# Patient Record
Sex: FEMALE | Race: WHITE | NOT HISPANIC OR LATINO | Employment: FULL TIME | URBAN - METROPOLITAN AREA
[De-identification: names, ages, dates, MRNs, and addresses within clinical notes are randomized per-mention and may not be internally consistent; named-entity substitution may affect disease eponyms.]

---

## 2021-09-02 ENCOUNTER — ANESTHESIA EVENT (OUTPATIENT)
Dept: GASTROENTEROLOGY | Facility: HOSPITAL | Age: 26
End: 2021-09-02

## 2021-09-02 ENCOUNTER — HOSPITAL ENCOUNTER (OUTPATIENT)
Facility: HOSPITAL | Age: 26
Discharge: HOME | End: 2021-09-02
Attending: INTERNAL MEDICINE | Admitting: INTERNAL MEDICINE

## 2021-09-02 ENCOUNTER — ANESTHESIA (OUTPATIENT)
Dept: GASTROENTEROLOGY | Facility: HOSPITAL | Age: 26
End: 2021-09-02

## 2021-09-02 ENCOUNTER — APPOINTMENT (OUTPATIENT)
Dept: GENERAL RADIOLOGY | Facility: HOSPITAL | Age: 26
End: 2021-09-02

## 2021-09-02 VITALS
OXYGEN SATURATION: 96 % | HEART RATE: 81 BPM | WEIGHT: 249.6 LBS | SYSTOLIC BLOOD PRESSURE: 140 MMHG | RESPIRATION RATE: 29 BRPM | DIASTOLIC BLOOD PRESSURE: 62 MMHG | BODY MASS INDEX: 44.23 KG/M2 | HEIGHT: 63 IN | TEMPERATURE: 101 F

## 2021-09-02 DIAGNOSIS — K80.50 COMMON BILE DUCT CALCULI: Primary | ICD-10-CM

## 2021-09-02 DIAGNOSIS — R74.8 ELEVATED LIVER ENZYMES: ICD-10-CM

## 2021-09-02 PROBLEM — E66.9 OBESITY (BMI 30-39.9): Chronic | Status: ACTIVE | Noted: 2021-09-02

## 2021-09-02 PROBLEM — Z72.0 TOBACCO ABUSE: Chronic | Status: ACTIVE | Noted: 2021-09-02

## 2021-09-02 LAB
ALBUMIN SERPL-MCNC: 4.5 G/DL (ref 3.5–5.2)
ALBUMIN/GLOB SERPL: 1.2 G/DL
ALP SERPL-CCNC: 182 U/L (ref 39–117)
ALT SERPL W P-5'-P-CCNC: 539 U/L (ref 1–33)
ANION GAP SERPL CALCULATED.3IONS-SCNC: 11 MMOL/L (ref 5–15)
AST SERPL-CCNC: 335 U/L (ref 1–32)
BILIRUB SERPL-MCNC: 2 MG/DL (ref 0–1.2)
BUN SERPL-MCNC: 6 MG/DL (ref 6–20)
BUN/CREAT SERPL: 6.8 (ref 7–25)
CALCIUM SPEC-SCNC: 9.3 MG/DL (ref 8.6–10.5)
CHLORIDE SERPL-SCNC: 100 MMOL/L (ref 98–107)
CO2 SERPL-SCNC: 27 MMOL/L (ref 22–29)
CREAT SERPL-MCNC: 0.88 MG/DL (ref 0.57–1)
D-LACTATE SERPL-SCNC: 1.6 MMOL/L (ref 0.5–2)
DEPRECATED RDW RBC AUTO: 42.4 FL (ref 37–54)
ERYTHROCYTE [DISTWIDTH] IN BLOOD BY AUTOMATED COUNT: 14.5 % (ref 12.3–15.4)
GFR SERPL CREATININE-BSD FRML MDRD: 77 ML/MIN/1.73
GLOBULIN UR ELPH-MCNC: 3.7 GM/DL
GLUCOSE SERPL-MCNC: 91 MG/DL (ref 65–99)
HCG INTACT+B SERPL-ACNC: <0.1 MIU/ML
HCT VFR BLD AUTO: 43.1 % (ref 34–46.6)
HGB BLD-MCNC: 14.3 G/DL (ref 12–15.9)
MCH RBC QN AUTO: 27.5 PG (ref 26.6–33)
MCHC RBC AUTO-ENTMCNC: 33.2 G/DL (ref 31.5–35.7)
MCV RBC AUTO: 82.7 FL (ref 79–97)
PLATELET # BLD AUTO: 263 10*3/MM3 (ref 140–450)
PMV BLD AUTO: 7.2 FL (ref 6–12)
POTASSIUM SERPL-SCNC: 4.3 MMOL/L (ref 3.5–5.2)
PROT SERPL-MCNC: 8.2 G/DL (ref 6–8.5)
RBC # BLD AUTO: 5.21 10*6/MM3 (ref 3.77–5.28)
SARS-COV-2 RNA PNL SPEC NAA+PROBE: NOT DETECTED
SODIUM SERPL-SCNC: 138 MMOL/L (ref 136–145)
WBC # BLD AUTO: 11.8 10*3/MM3 (ref 3.4–10.8)

## 2021-09-02 PROCEDURE — 25010000002 PROPOFOL 10 MG/ML EMULSION: Performed by: ANESTHESIOLOGY

## 2021-09-02 PROCEDURE — 99203 OFFICE O/P NEW LOW 30 MIN: CPT | Performed by: SURGERY

## 2021-09-02 PROCEDURE — 25010000002 ONDANSETRON PER 1 MG: Performed by: ANESTHESIOLOGY

## 2021-09-02 PROCEDURE — C1769 GUIDE WIRE: HCPCS | Performed by: INTERNAL MEDICINE

## 2021-09-02 PROCEDURE — 25010000002 KETOROLAC TROMETHAMINE PER 15 MG: Performed by: ANESTHESIOLOGY

## 2021-09-02 PROCEDURE — G0378 HOSPITAL OBSERVATION PER HR: HCPCS

## 2021-09-02 PROCEDURE — G0379 DIRECT REFER HOSPITAL OBSERV: HCPCS

## 2021-09-02 PROCEDURE — 83605 ASSAY OF LACTIC ACID: CPT | Performed by: NURSE PRACTITIONER

## 2021-09-02 PROCEDURE — C9803 HOPD COVID-19 SPEC COLLECT: HCPCS

## 2021-09-02 PROCEDURE — 87635 SARS-COV-2 COVID-19 AMP PRB: CPT | Performed by: INTERNAL MEDICINE

## 2021-09-02 PROCEDURE — 25010000002 MORPHINE PER 10 MG: Performed by: NURSE PRACTITIONER

## 2021-09-02 PROCEDURE — 84702 CHORIONIC GONADOTROPIN TEST: CPT | Performed by: NURSE PRACTITIONER

## 2021-09-02 PROCEDURE — 25010000002 IOPAMIDOL 61 % SOLUTION 30 ML VIAL: Performed by: INTERNAL MEDICINE

## 2021-09-02 PROCEDURE — 25010000002 FENTANYL CITRATE (PF) 100 MCG/2ML SOLUTION: Performed by: ANESTHESIOLOGY

## 2021-09-02 PROCEDURE — 80053 COMPREHEN METABOLIC PANEL: CPT | Performed by: NURSE PRACTITIONER

## 2021-09-02 PROCEDURE — 25010000002 NEOSTIGMINE 5 MG/5ML SOLUTION: Performed by: ANESTHESIOLOGY

## 2021-09-02 PROCEDURE — 99236 HOSP IP/OBS SAME DATE HI 85: CPT | Performed by: INTERNAL MEDICINE

## 2021-09-02 PROCEDURE — 74328 X-RAY BILE DUCT ENDOSCOPY: CPT

## 2021-09-02 PROCEDURE — 25010000002 DEXAMETHASONE PER 1 MG: Performed by: ANESTHESIOLOGY

## 2021-09-02 PROCEDURE — 96374 THER/PROPH/DIAG INJ IV PUSH: CPT

## 2021-09-02 PROCEDURE — 25010000002 MIDAZOLAM PER 1 MG: Performed by: ANESTHESIOLOGY

## 2021-09-02 PROCEDURE — 25010000002 PIPERACILLIN SOD-TAZOBACTAM PER 1 G: Performed by: NURSE PRACTITIONER

## 2021-09-02 PROCEDURE — 85027 COMPLETE CBC AUTOMATED: CPT | Performed by: NURSE PRACTITIONER

## 2021-09-02 RX ORDER — ALBUTEROL SULFATE 2.5 MG/3ML
2.5 SOLUTION RESPIRATORY (INHALATION) ONCE AS NEEDED
Status: DISCONTINUED | OUTPATIENT
Start: 2021-09-02 | End: 2021-09-02

## 2021-09-02 RX ORDER — GLYCOPYRROLATE 1 MG/5 ML
SYRINGE (ML) INTRAVENOUS AS NEEDED
Status: DISCONTINUED | OUTPATIENT
Start: 2021-09-02 | End: 2021-09-02 | Stop reason: SURG

## 2021-09-02 RX ORDER — PROPOFOL 10 MG/ML
VIAL (ML) INTRAVENOUS AS NEEDED
Status: DISCONTINUED | OUTPATIENT
Start: 2021-09-02 | End: 2021-09-02 | Stop reason: SURG

## 2021-09-02 RX ORDER — MIDAZOLAM HYDROCHLORIDE 1 MG/ML
1 INJECTION INTRAMUSCULAR; INTRAVENOUS
Status: DISCONTINUED | OUTPATIENT
Start: 2021-09-02 | End: 2021-09-02

## 2021-09-02 RX ORDER — HYDROMORPHONE HCL 110MG/55ML
0.5 PATIENT CONTROLLED ANALGESIA SYRINGE INTRAVENOUS
Status: DISCONTINUED | OUTPATIENT
Start: 2021-09-02 | End: 2021-09-02

## 2021-09-02 RX ORDER — ACETAMINOPHEN 650 MG/1
650 SUPPOSITORY RECTAL EVERY 4 HOURS PRN
Status: CANCELLED | OUTPATIENT
Start: 2021-09-02

## 2021-09-02 RX ORDER — ALBUTEROL SULFATE 2.5 MG/3ML
2.5 SOLUTION RESPIRATORY (INHALATION) ONCE AS NEEDED
Status: DISCONTINUED | OUTPATIENT
Start: 2021-09-02 | End: 2021-09-02 | Stop reason: HOSPADM

## 2021-09-02 RX ORDER — FLUMAZENIL 0.1 MG/ML
0.2 INJECTION INTRAVENOUS AS NEEDED
Status: DISCONTINUED | OUTPATIENT
Start: 2021-09-02 | End: 2021-09-02

## 2021-09-02 RX ORDER — ONDANSETRON 4 MG/1
4 TABLET, FILM COATED ORAL EVERY 8 HOURS PRN
COMMUNITY

## 2021-09-02 RX ORDER — NALOXONE HCL 0.4 MG/ML
0.4 VIAL (ML) INJECTION AS NEEDED
Status: DISCONTINUED | OUTPATIENT
Start: 2021-09-02 | End: 2021-09-02 | Stop reason: HOSPADM

## 2021-09-02 RX ORDER — DIPHENHYDRAMINE HYDROCHLORIDE 50 MG/ML
12.5 INJECTION INTRAMUSCULAR; INTRAVENOUS
Status: DISCONTINUED | OUTPATIENT
Start: 2021-09-02 | End: 2021-09-02

## 2021-09-02 RX ORDER — ACETAMINOPHEN 325 MG/1
650 TABLET ORAL EVERY 4 HOURS PRN
Status: DISCONTINUED | OUTPATIENT
Start: 2021-09-02 | End: 2021-09-02 | Stop reason: HOSPADM

## 2021-09-02 RX ORDER — SODIUM CHLORIDE 9 MG/ML
INJECTION INTRAVENOUS
Status: DISCONTINUED
Start: 2021-09-02 | End: 2021-09-02 | Stop reason: HOSPADM

## 2021-09-02 RX ORDER — NALBUPHINE HCL 10 MG/ML
2 AMPUL (ML) INJECTION EVERY 4 HOURS PRN
Status: DISCONTINUED | OUTPATIENT
Start: 2021-09-02 | End: 2021-09-02 | Stop reason: HOSPADM

## 2021-09-02 RX ORDER — ACETAMINOPHEN 650 MG/1
650 SUPPOSITORY RECTAL EVERY 4 HOURS PRN
Status: DISCONTINUED | OUTPATIENT
Start: 2021-09-02 | End: 2021-09-02 | Stop reason: HOSPADM

## 2021-09-02 RX ORDER — FLUMAZENIL 0.1 MG/ML
0.2 INJECTION INTRAVENOUS AS NEEDED
Status: DISCONTINUED | OUTPATIENT
Start: 2021-09-02 | End: 2021-09-02 | Stop reason: HOSPADM

## 2021-09-02 RX ORDER — HYDROCODONE BITARTRATE AND ACETAMINOPHEN 5; 325 MG/1; MG/1
1 TABLET ORAL EVERY 6 HOURS PRN
COMMUNITY

## 2021-09-02 RX ORDER — ROCURONIUM BROMIDE 10 MG/ML
INJECTION, SOLUTION INTRAVENOUS AS NEEDED
Status: DISCONTINUED | OUTPATIENT
Start: 2021-09-02 | End: 2021-09-02 | Stop reason: SURG

## 2021-09-02 RX ORDER — NALBUPHINE HCL 10 MG/ML
2 AMPUL (ML) INJECTION EVERY 4 HOURS PRN
Status: DISCONTINUED | OUTPATIENT
Start: 2021-09-02 | End: 2021-09-02

## 2021-09-02 RX ORDER — NALBUPHINE HCL 10 MG/ML
10 AMPUL (ML) INJECTION EVERY 4 HOURS PRN
Status: DISCONTINUED | OUTPATIENT
Start: 2021-09-02 | End: 2021-09-02

## 2021-09-02 RX ORDER — ACETAMINOPHEN 160 MG/5ML
650 SOLUTION ORAL EVERY 4 HOURS PRN
Status: DISCONTINUED | OUTPATIENT
Start: 2021-09-02 | End: 2021-09-02 | Stop reason: HOSPADM

## 2021-09-02 RX ORDER — SODIUM CHLORIDE 9 MG/ML
100 INJECTION, SOLUTION INTRAVENOUS CONTINUOUS
Status: CANCELLED | OUTPATIENT
Start: 2021-09-02

## 2021-09-02 RX ORDER — ONDANSETRON 2 MG/ML
INJECTION INTRAMUSCULAR; INTRAVENOUS AS NEEDED
Status: DISCONTINUED | OUTPATIENT
Start: 2021-09-02 | End: 2021-09-02 | Stop reason: SURG

## 2021-09-02 RX ORDER — MAGNESIUM SULFATE 1 G/100ML
1 INJECTION INTRAVENOUS AS NEEDED
Status: DISCONTINUED | OUTPATIENT
Start: 2021-09-02 | End: 2021-09-02 | Stop reason: HOSPADM

## 2021-09-02 RX ORDER — ONDANSETRON 2 MG/ML
4 INJECTION INTRAMUSCULAR; INTRAVENOUS EVERY 6 HOURS PRN
Status: DISCONTINUED | OUTPATIENT
Start: 2021-09-02 | End: 2021-09-02 | Stop reason: HOSPADM

## 2021-09-02 RX ORDER — SODIUM CHLORIDE 9 MG/ML
100 INJECTION, SOLUTION INTRAVENOUS CONTINUOUS
Status: DISCONTINUED | OUTPATIENT
Start: 2021-09-02 | End: 2021-09-02 | Stop reason: HOSPADM

## 2021-09-02 RX ORDER — FENTANYL CITRATE 50 UG/ML
INJECTION, SOLUTION INTRAMUSCULAR; INTRAVENOUS AS NEEDED
Status: DISCONTINUED | OUTPATIENT
Start: 2021-09-02 | End: 2021-09-02 | Stop reason: SURG

## 2021-09-02 RX ORDER — LIDOCAINE HYDROCHLORIDE 20 MG/ML
INJECTION, SOLUTION EPIDURAL; INFILTRATION; INTRACAUDAL; PERINEURAL AS NEEDED
Status: DISCONTINUED | OUTPATIENT
Start: 2021-09-02 | End: 2021-09-02 | Stop reason: SURG

## 2021-09-02 RX ORDER — ONDANSETRON 4 MG/1
4 TABLET, FILM COATED ORAL EVERY 6 HOURS PRN
Status: DISCONTINUED | OUTPATIENT
Start: 2021-09-02 | End: 2021-09-02 | Stop reason: HOSPADM

## 2021-09-02 RX ORDER — SODIUM CHLORIDE 0.9 % (FLUSH) 0.9 %
10 SYRINGE (ML) INJECTION AS NEEDED
Status: CANCELLED | OUTPATIENT
Start: 2021-09-02

## 2021-09-02 RX ORDER — NALBUPHINE HCL 10 MG/ML
10 AMPUL (ML) INJECTION EVERY 4 HOURS PRN
Status: DISCONTINUED | OUTPATIENT
Start: 2021-09-02 | End: 2021-09-02 | Stop reason: HOSPADM

## 2021-09-02 RX ORDER — ACETAMINOPHEN 325 MG/1
650 TABLET ORAL EVERY 4 HOURS PRN
Status: CANCELLED | OUTPATIENT
Start: 2021-09-02

## 2021-09-02 RX ORDER — ONDANSETRON 2 MG/ML
4 INJECTION INTRAMUSCULAR; INTRAVENOUS ONCE AS NEEDED
Status: DISCONTINUED | OUTPATIENT
Start: 2021-09-02 | End: 2021-09-02

## 2021-09-02 RX ORDER — MIDAZOLAM HYDROCHLORIDE 1 MG/ML
1 INJECTION INTRAMUSCULAR; INTRAVENOUS
Status: DISCONTINUED | OUTPATIENT
Start: 2021-09-02 | End: 2021-09-02 | Stop reason: HOSPADM

## 2021-09-02 RX ORDER — MORPHINE SULFATE 4 MG/ML
2 INJECTION, SOLUTION INTRAMUSCULAR; INTRAVENOUS EVERY 4 HOURS PRN
Status: DISCONTINUED | OUTPATIENT
Start: 2021-09-02 | End: 2021-09-02 | Stop reason: HOSPADM

## 2021-09-02 RX ORDER — LORAZEPAM 2 MG/ML
1 INJECTION INTRAMUSCULAR
Status: DISCONTINUED | OUTPATIENT
Start: 2021-09-02 | End: 2021-09-02 | Stop reason: HOSPADM

## 2021-09-02 RX ORDER — KETOROLAC TROMETHAMINE 30 MG/ML
INJECTION, SOLUTION INTRAMUSCULAR; INTRAVENOUS AS NEEDED
Status: DISCONTINUED | OUTPATIENT
Start: 2021-09-02 | End: 2021-09-02 | Stop reason: SURG

## 2021-09-02 RX ORDER — ACETAMINOPHEN 160 MG/5ML
650 SOLUTION ORAL EVERY 4 HOURS PRN
Status: CANCELLED | OUTPATIENT
Start: 2021-09-02

## 2021-09-02 RX ORDER — HYDROMORPHONE HCL 110MG/55ML
0.5 PATIENT CONTROLLED ANALGESIA SYRINGE INTRAVENOUS
Status: DISCONTINUED | OUTPATIENT
Start: 2021-09-02 | End: 2021-09-02 | Stop reason: HOSPADM

## 2021-09-02 RX ORDER — SODIUM CHLORIDE 0.9 % (FLUSH) 0.9 %
10 SYRINGE (ML) INJECTION EVERY 12 HOURS SCHEDULED
Status: DISCONTINUED | OUTPATIENT
Start: 2021-09-02 | End: 2021-09-02 | Stop reason: HOSPADM

## 2021-09-02 RX ORDER — NALOXONE HCL 0.4 MG/ML
0.4 VIAL (ML) INJECTION AS NEEDED
Status: DISCONTINUED | OUTPATIENT
Start: 2021-09-02 | End: 2021-09-02

## 2021-09-02 RX ORDER — MEPERIDINE HYDROCHLORIDE 25 MG/ML
12.5 INJECTION INTRAMUSCULAR; INTRAVENOUS; SUBCUTANEOUS
Status: DISCONTINUED | OUTPATIENT
Start: 2021-09-02 | End: 2021-09-02

## 2021-09-02 RX ORDER — DIPHENHYDRAMINE HYDROCHLORIDE 50 MG/ML
12.5 INJECTION INTRAMUSCULAR; INTRAVENOUS
Status: DISCONTINUED | OUTPATIENT
Start: 2021-09-02 | End: 2021-09-02 | Stop reason: HOSPADM

## 2021-09-02 RX ORDER — ONDANSETRON 4 MG/1
4 TABLET, FILM COATED ORAL EVERY 6 HOURS PRN
Status: CANCELLED | OUTPATIENT
Start: 2021-09-02

## 2021-09-02 RX ORDER — ONDANSETRON 2 MG/ML
4 INJECTION INTRAMUSCULAR; INTRAVENOUS ONCE AS NEEDED
Status: DISCONTINUED | OUTPATIENT
Start: 2021-09-02 | End: 2021-09-02 | Stop reason: HOSPADM

## 2021-09-02 RX ORDER — DEXAMETHASONE SODIUM PHOSPHATE 4 MG/ML
INJECTION, SOLUTION INTRA-ARTICULAR; INTRALESIONAL; INTRAMUSCULAR; INTRAVENOUS; SOFT TISSUE AS NEEDED
Status: DISCONTINUED | OUTPATIENT
Start: 2021-09-02 | End: 2021-09-02 | Stop reason: SURG

## 2021-09-02 RX ORDER — POTASSIUM CHLORIDE 20 MEQ/1
40 TABLET, EXTENDED RELEASE ORAL AS NEEDED
Status: DISCONTINUED | OUTPATIENT
Start: 2021-09-02 | End: 2021-09-02 | Stop reason: HOSPADM

## 2021-09-02 RX ORDER — LORAZEPAM 2 MG/ML
1 INJECTION INTRAMUSCULAR
Status: DISCONTINUED | OUTPATIENT
Start: 2021-09-02 | End: 2021-09-02

## 2021-09-02 RX ORDER — SODIUM CHLORIDE 0.9 % (FLUSH) 0.9 %
10 SYRINGE (ML) INJECTION EVERY 12 HOURS SCHEDULED
Status: CANCELLED | OUTPATIENT
Start: 2021-09-02

## 2021-09-02 RX ORDER — MIDAZOLAM HYDROCHLORIDE 1 MG/ML
INJECTION INTRAMUSCULAR; INTRAVENOUS AS NEEDED
Status: DISCONTINUED | OUTPATIENT
Start: 2021-09-02 | End: 2021-09-02 | Stop reason: SURG

## 2021-09-02 RX ORDER — PANTOPRAZOLE SODIUM 40 MG/10ML
40 INJECTION, POWDER, LYOPHILIZED, FOR SOLUTION INTRAVENOUS
Status: DISCONTINUED | OUTPATIENT
Start: 2021-09-03 | End: 2021-09-02 | Stop reason: HOSPADM

## 2021-09-02 RX ORDER — SODIUM CHLORIDE 0.9 % (FLUSH) 0.9 %
10 SYRINGE (ML) INJECTION AS NEEDED
Status: DISCONTINUED | OUTPATIENT
Start: 2021-09-02 | End: 2021-09-02 | Stop reason: HOSPADM

## 2021-09-02 RX ORDER — NEOSTIGMINE METHYLSULFATE 5 MG/5 ML
SYRINGE (ML) INTRAVENOUS AS NEEDED
Status: DISCONTINUED | OUTPATIENT
Start: 2021-09-02 | End: 2021-09-02 | Stop reason: SURG

## 2021-09-02 RX ORDER — SODIUM CHLORIDE, SODIUM LACTATE, POTASSIUM CHLORIDE, CALCIUM CHLORIDE 600; 310; 30; 20 MG/100ML; MG/100ML; MG/100ML; MG/100ML
INJECTION, SOLUTION INTRAVENOUS CONTINUOUS PRN
Status: DISCONTINUED | OUTPATIENT
Start: 2021-09-02 | End: 2021-09-02 | Stop reason: SURG

## 2021-09-02 RX ORDER — MEPERIDINE HYDROCHLORIDE 25 MG/ML
12.5 INJECTION INTRAMUSCULAR; INTRAVENOUS; SUBCUTANEOUS
Status: DISCONTINUED | OUTPATIENT
Start: 2021-09-02 | End: 2021-09-02 | Stop reason: HOSPADM

## 2021-09-02 RX ORDER — ONDANSETRON 2 MG/ML
4 INJECTION INTRAMUSCULAR; INTRAVENOUS EVERY 6 HOURS PRN
Status: CANCELLED | OUTPATIENT
Start: 2021-09-02

## 2021-09-02 RX ORDER — PANTOPRAZOLE SODIUM 40 MG/10ML
40 INJECTION, POWDER, LYOPHILIZED, FOR SOLUTION INTRAVENOUS
Status: CANCELLED | OUTPATIENT
Start: 2021-09-02

## 2021-09-02 RX ORDER — MAGNESIUM SULFATE HEPTAHYDRATE 40 MG/ML
2 INJECTION, SOLUTION INTRAVENOUS AS NEEDED
Status: DISCONTINUED | OUTPATIENT
Start: 2021-09-02 | End: 2021-09-02 | Stop reason: HOSPADM

## 2021-09-02 RX ADMIN — DEXAMETHASONE SODIUM PHOSPHATE 4 MG: 4 INJECTION, SOLUTION INTRAMUSCULAR; INTRAVENOUS at 16:18

## 2021-09-02 RX ADMIN — ONDANSETRON 4 MG: 2 INJECTION INTRAMUSCULAR; INTRAVENOUS at 16:18

## 2021-09-02 RX ADMIN — Medication 4 MG: at 15:52

## 2021-09-02 RX ADMIN — MIDAZOLAM 2 MG: 1 INJECTION INTRAMUSCULAR; INTRAVENOUS at 16:18

## 2021-09-02 RX ADMIN — SODIUM CHLORIDE, SODIUM LACTATE, POTASSIUM CHLORIDE, AND CALCIUM CHLORIDE: .6; .31; .03; .02 INJECTION, SOLUTION INTRAVENOUS at 16:18

## 2021-09-02 RX ADMIN — INDOMETHACIN: 50 SUPPOSITORY RECTAL at 17:45

## 2021-09-02 RX ADMIN — PIPERACILLIN SODIUM AND TAZOBACTAM SODIUM 4.5 G: 4; .5 INJECTION, POWDER, LYOPHILIZED, FOR SOLUTION INTRAVENOUS at 13:58

## 2021-09-02 RX ADMIN — ROCURONIUM BROMIDE 50 MG: 10 INJECTION INTRAVENOUS at 16:18

## 2021-09-02 RX ADMIN — Medication 0.8 MG: at 15:52

## 2021-09-02 RX ADMIN — FENTANYL CITRATE 100 MCG: 50 INJECTION, SOLUTION INTRAMUSCULAR; INTRAVENOUS at 16:18

## 2021-09-02 RX ADMIN — SODIUM CHLORIDE, PRESERVATIVE FREE 10 ML: 5 INJECTION INTRAVENOUS at 13:28

## 2021-09-02 RX ADMIN — KETOROLAC TROMETHAMINE 30 MG: 30 INJECTION, SOLUTION INTRAMUSCULAR at 16:18

## 2021-09-02 RX ADMIN — PROPOFOL 200 MG: 10 INJECTION, EMULSION INTRAVENOUS at 16:18

## 2021-09-02 RX ADMIN — LIDOCAINE HYDROCHLORIDE 50 MG: 20 INJECTION, SOLUTION EPIDURAL; INFILTRATION; INTRACAUDAL; PERINEURAL at 16:18

## 2021-09-02 RX ADMIN — MORPHINE SULFATE 2 MG: 4 INJECTION INTRAVENOUS at 13:26

## 2021-09-02 RX ADMIN — ACETAMINOPHEN 650 MG: 325 TABLET, FILM COATED ORAL at 13:26

## 2021-09-02 RX ADMIN — SUGAMMADEX 200 MG: 100 INJECTION, SOLUTION INTRAVENOUS at 16:50

## 2021-09-02 RX ADMIN — SODIUM CHLORIDE 100 ML/HR: 9 INJECTION, SOLUTION INTRAVENOUS at 13:27

## 2021-09-02 NOTE — DISCHARGE SUMMARY
[facility] Medicine Services  DISCHARGE SUMMARY    Patient Name: Kimberly Penaloza  : 1995  MRN: [id number]    Date of Admission: 2021  Date of Discharge: 9/3/2021  Primary Care Physician: Eber Angel MD      Presenting Problem:   Common bile duct calculi [K80.50]    Active and Resolved Hospital Problems:  Active Hospital Problems    Diagnosis POA   • **Choledocholithiasis [K80.50] Yes   • Obesity (BMI 30-39.9) [E66.9] Yes   • Tobacco abuse [Z72.0] Yes   • Elevated liver enzymes [R74.8] Yes      Resolved Hospital Problems   No resolved problems to display.         Hospital Course     Hospital Course:  Kimberly Penaloza is a 26 y.o. female with no known past medical history who presented to [facility] on 2021 with complaints of abdominal pain.  Patient reports she had abdominal pain that started on Saturday.  [facility] gave her pain medication and nausea medication and referred her to follow-up with a general surgeon outpatient.  On Monday she went to follow-up with general surgeon and he ordered some outpatient labs.  Patient reports she was supposed to get the blood work done today but she started to vomit and her pain started to get worse. The following records were obtained from [facility]:  All vital signs were stable.  All labs were unremarkable except , , total bilirubin 2.0.  CT abdomen pelvis showed cholelithiasis and choledocholithiasis with dilated common bile duct measuring up to 12 mm.  Recommend surgical and/or GI consultation for likely ERCP.  Hepatic steatosis.  Patient was transferred to [facility] and GI and general surgery consulted.  Patient was started on IV Zosyn initially, GI evaluated the patient and decided for ERCP on the same day.  Patient underwent ERCP with results as noted below.  She has done well postop, she was also evaluated for surgery.  After further discussion  with patient she wanted to leave after the procedure and follow-up outpatient for cholecystectomy with general surgery.  Due to urgent need to leave the hospital due to issues with childcare at home GI and general surgery okay with discharge with outpatient follow-up.  SHANELL already sent oral Cipro and Flagyl to her pharmacy for pickup.  Patient is clinically stable to discharge home today with follow-up with PCP, GI, and general surgery as an outpatient.      DISCHARGE Follow Up Recommendations for labs and diagnostics: Follow-up with PCP, GI, and general surgery.      Reasons For Change In Medications and Indications for New Medications:  Cipro and Flagyl added by SHANELL, directly sent to her pharmacy.    Day of Discharge     Vital Signs:  Temp:  [101 °F (38.3 °C)] 101 °F (38.3 °C)  Heart Rate:  [] 81  Resp:  [16-33] 29  BP: (112-173)/(52-78) 140/62  Flow (L/min):  [4] 4    Physical Exam:  General: Awake, alert, NAD  Eyes: PERRL, EOMI, conjunctive are clear  Cardiovascular: Regular rate and rhythm, no murmurs  Respiratory: Clear to auscultation bilaterally, no wheezing or rales, unlabored breathing  Abdomen: Soft, right upper quadrant tenderness noted, positive bowel sounds, no guarding  Neurologic: A&O, CN grossly intact, moves all extremities spontaneously  Musculoskeletal: Normal range of motion, no deformities  Skin: Warm, dry, intact        Pertinent  and/or Most Recent Results     LAB RESULTS:      Lab 09/02/21  1302   WBC 11.80*   HEMOGLOBIN 14.3   HEMATOCRIT 43.1   PLATELETS 263   MCV 82.7   LACTATE 1.6         Lab 09/02/21  1301   SODIUM 138   POTASSIUM 4.3   CHLORIDE 100   CO2 27.0   ANION GAP 11.0   BUN 6   CREATININE 0.88   GLUCOSE 91   CALCIUM 9.3         Lab 09/02/21  1301   TOTAL PROTEIN 8.2   ALBUMIN 4.50   GLOBULIN 3.7   ALT (SGPT) 539*   AST (SGOT) 335*   BILIRUBIN 2.0*   ALK PHOS 182*                     Brief Urine Lab Results     None        Microbiology Results (last 10 days)     Procedure  Component Value - Date/Time    COVID PRE-OP / PRE-PROCEDURE SCREENING ORDER (NO ISOLATION) - Swab, Nasopharynx [[id number]]  (Normal) Collected: 09/02/21 1314    Lab Status: Final result Specimen: Swab from Nasopharynx Updated: 09/02/21 1409    Narrative:      The following orders were created for panel order COVID PRE-OP / PRE-PROCEDURE SCREENING ORDER (NO ISOLATION) - Swab, Nasopharynx.  Procedure                               Abnormality         Status                     ---------                               -----------         ------                     COVID-19,CEPHEID/TIFFANIE/BD...[[id number]]  Normal              Final result                 Please view results for these tests on the individual orders.    COVID-19,CEPHEID/TIFFANIE/BDMAX,COR/FELIBERTO/PAD/JORDANA IN-HOUSE(OR EMERGENT/ADD-ON),NP SWAB IN TRANSPORT MEDIA 3-4 HR TAT, RT-PCR - Swab, Nasopharynx [[id number]]  (Normal) Collected: 09/02/21 1314    Lab Status: Final result Specimen: Swab from Nasopharynx Updated: 09/02/21 1409     COVID19 Not Detected    Narrative:      Fact sheet for providers: https://www.fda.gov/media/831818/download     Fact sheet for patients: https://www.fda.gov/media/058635/download  Fact sheet for providers: https://www.fda.gov/media/704325/download    Fact sheet for patients: https://www.fda.gov/media/891857/download    Test performed by PCR.          FL ercp biliary duct only    Result Date: 9/2/2021  Impression: 1. ERCP images as reported above.  Electronically Signed By-Adrianna Taylor MD On:9/2/2021 5:24 PM This report was finalized on [id number] by  Adrianna Taylor MD.                  Labs Pending at Discharge:      Procedures Performed  Procedure(s):  ENDOSCOPIC RETROGRADE CHOLANGIOPANCREATOGRAPHY, SPHINCTEROTOMY, CLEARANCE OF BILE DUCT WITH BALLOON ,(12MM-15MM, UP TO 15 MM), EXTRACTION OF BILIARY STONES WITH BALLOON       Impression:  Choledocholithiasis status post ERCP with biliary sphincterotomy and successful extraction of CBD  stones  Cholelithiasis     Recommendations:  No aspirin or NSAIDs for 7 to 10 days  Cholecystectomy, timing per patient surgeon  If patient tolerates clear liquids upon return to her room, okay to discharge home due to need for urgent childcare.  However she cannot drive today due to anesthesia.  Though no purulence was noted in the common bile duct, I would recommend patient complete a course of antibiotics for possible early cholangitis.  Antibiotics have been sent into the patient's pharmacy electronically.     Consults:   Consults     Date and Time Order Name Status Description    9/2/2021 12:34 PM Inpatient General Surgery Consult Completed     9/2/2021 12:25 PM Inpatient Gastroenterology Consult              Discharge Details        Discharge Medications      Continue These Medications      Instructions Start Date   HYDROcodone-acetaminophen 5-325 MG per tablet  Commonly known as: NORCO   1 tablet, Oral, Every 6 Hours PRN      ondansetron 4 MG tablet  Commonly known as: ZOFRAN   4 mg, Oral, Every 8 Hours PRN, Pt states she takes this medication whenever she feel like it          Cipro and Flagyl added per GI, directly sent to her pharmacy.    No Known Allergies      Discharge Disposition:  Home or Self Care    Diet:  Hospital:  Diet Order   Procedures   • NPO Diet         Discharge Activity:         CODE STATUS:  Code Status and Medical Interventions:   Ordered at: 09/02/21 1225     Level Of Support Discussed With:    Patient     Code Status:    CPR     Medical Interventions (Level of Support Prior to Arrest):    Full         No future appointments.        Time spent on the H&P discharge including face to face service:  55 minutes    Signature:    Electronically signed by Vineet Olvera DO, 09/02/21, 5:36 PM EDT.

## 2021-09-02 NOTE — CONSULTS
GENERAL SURGERY CONSULT      Patient Care Team:  Eber Angel MD as PCP - General (Family Medicine)    Chief complaint cholelithiasis and choledocholithiasis  Subjective     Is a 26-year-old young lady who presented to [facility] with abdominal pain.  She had been known to have gallstones.  CT scan at that facility demonstrated cholelithiasis and choledocholithiasis with common bile duct of 12 mm and hepatic steatosis.  This is also shown on right upper quadrant ultrasound.  She also had elevation of her LFTs and alkaline phosphatase as well as a bilirubin of 2.0.  Lipase was normal.  She has been seen by gastroenterology.  Plan is for ERCP today.        Review of Systems   All systems were reviewed and negative except for:  Gastrointestinal: positive for  nausea, pain and See HPI    History  Past Medical History:   Diagnosis Date   • Tobacco abuse 2021     Past Surgical History:   Procedure Laterality Date   •  SECTION     • ESSURE TUBAL LIGATION       Family History   Problem Relation Age of Onset   • Chromosomal disorder Brother    • Cancer Brother      Social History     Tobacco Use   • Smoking status: Current Every Day Smoker     Packs/day: 0.50     Years: 5.00     Pack years: 2.50     Types: Cigarettes   • Smokeless tobacco: Never Used   Vaping Use   • Vaping Use: Never used   Substance Use Topics   • Alcohol use: Not Currently   • Drug use: Not Currently     Medications Prior to Admission   Medication Sig Dispense Refill Last Dose   • HYDROcodone-acetaminophen (NORCO) 5-325 MG per tablet Take 1 tablet by mouth Every 6 (Six) Hours As Needed.      • ondansetron (ZOFRAN) 4 MG tablet Take 4 mg by mouth Every 8 (Eight) Hours As Needed for Nausea or Vomiting. Pt states she takes this medication whenever she feel like it        Allergies:  Patient has no known allergies.    Objective     Vital Signs  Temp:  [101 °F (38.3 °C)] 101 °F (38.3 °C)  Heart Rate:  [82-83] 82  Resp:  [16-18]  16  BP: (128-173)/(64-78) 128/64    Physical Exam:      General Appearance:    Alert, cooperative, in no acute distress   Head:    Normocephalic, without obvious abnormality, atraumatic,    Eyes:            Lids and lashes normal, conjunctivae and sclerae normal, no   icterus, no pallor, corneas clear, PERRLA   Ears:    Ears appear intact with no abnormalities noted   Throat:   No oral lesions, no thrush, oral mucosa moist   Neck:   No adenopathy, supple, trachea midline, no thyromegaly, no   carotid bruit, no JVD   Back:     No kyphosis present, no scoliosis present, no skin lesions,      erythema or scars, no tenderness to percussion or                   palpation,   range of motion normal   Lungs:     Clear to auscultation,respirations regular, even and                  unlabored    Heart:    Regular rhythm and normal rate, normal S1 and S2, no            murmur, no gallop, no rub, no click   Chest Wall:    No abnormalities observed   Abdomen:     Normal bowel sounds, no masses, no organomegaly, soft        non-tender, non-distended, no guarding, no rebound                tenderness   Rectal:     Deferred   Extremities: No edema, good ROM   Pulses:   Pulses palpable and equal bilaterally   Skin:   No bleeding, bruising or rash   Lymph nodes:   No palpable adenopathy   Neurologic:   Cranial nerves 2 - 12 grossly intact, sensation intact, DTR       present and equal bilaterally     Lab Results (last 24 hours)     Procedure Component Value Units Date/Time    COVID PRE-OP / PRE-PROCEDURE SCREENING ORDER (NO ISOLATION) - Swab, Nasopharynx [[id number]]  (Normal) Collected: 09/02/21 1314    Specimen: Swab from Nasopharynx Updated: 09/02/21 1409    Narrative:      The following orders were created for panel order COVID PRE-OP / PRE-PROCEDURE SCREENING ORDER (NO ISOLATION) - Swab, Nasopharynx.  Procedure                               Abnormality         Status                     ---------                                -----------         ------                     COVID-19,CEPHEID/TIFFANIE/BD...[[id number]]  Normal              Final result                 Please view results for these tests on the individual orders.    COVID-19,CEPHEID/TIFFANIE/BDMAX,COR/FELIBERTO/PAD/JORDANA IN-HOUSE(OR EMERGENT/ADD-ON),NP SWAB IN TRANSPORT MEDIA 3-4 HR TAT, RT-PCR - Swab, Nasopharynx [[id number]]  (Normal) Collected: 09/02/21 1314    Specimen: Swab from Nasopharynx Updated: 09/02/21 1409     COVID19 Not Detected    Narrative:      Fact sheet for providers: https://www.fda.gov/media/200180/download     Fact sheet for patients: https://www.fda.gov/media/042529/download  Fact sheet for providers: https://www.fda.gov/media/773173/download    Fact sheet for patients: https://www.fda.gov/media/562785/download    Test performed by PCR.    Comprehensive Metabolic Panel [[id number]]  (Abnormal) Collected: 09/02/21 1301    Specimen: Blood Updated: 09/02/21 1342     Glucose 91 mg/dL      BUN 6 mg/dL      Creatinine 0.88 mg/dL      Sodium 138 mmol/L      Potassium 4.3 mmol/L      Chloride 100 mmol/L      CO2 27.0 mmol/L      Calcium 9.3 mg/dL      Total Protein 8.2 g/dL      Albumin 4.50 g/dL      ALT (SGPT) 539 U/L      AST (SGOT) 335 U/L      Alkaline Phosphatase 182 U/L      Total Bilirubin 2.0 mg/dL      eGFR Non African Amer 77 mL/min/1.73      Globulin 3.7 gm/dL      A/G Ratio 1.2 g/dL      BUN/Creatinine Ratio 6.8     Anion Gap 11.0 mmol/L     Narrative:      GFR Normal >60  Chronic Kidney Disease <60  Kidney Failure <15      Lactic Acid, Plasma [[id number]]  (Normal) Collected: 09/02/21 1302    Specimen: Blood Updated: 09/02/21 1337     Lactate 1.6 mmol/L     CBC (No Diff) [[id number]]  (Abnormal) Collected: 09/02/21 1302    Specimen: Blood Updated: 09/02/21 1328     WBC 11.80 10*3/mm3      RBC 5.21 10*6/mm3      Hemoglobin 14.3 g/dL      Hematocrit 43.1 %      MCV 82.7 fL      MCH 27.5 pg      MCHC 33.2 g/dL      RDW 14.5 %      RDW-SD 42.4 fl      MPV 7.2 fL       Platelets 263 10*3/mm3           Imaging Results (Last 24 Hours)     ** No results found for the last 24 hours. **          Results Review:    I reviewed the patient's new clinical results.  I reviewed the patient's new imaging results and agree with the interpretation.    Assessment/Plan       Common bile duct calculi    Obesity (BMI 30-39.9)    Tobacco abuse    Elevated liver enzymes      26-year-old lady presents with cholelithiasis and choledocholithiasis.  Plan is for ERCP today.  I explained her the risks and benefits of laparoscopic cholecystectomy including bile duct injury and bleeding and she understands this.  Next  She is having difficulty arranging for childcare over the weekend.  It is not convenient for her to have her laparoscopic cholecystectomy tomorrow.  I told her I am happy to follow-up with her as an outpatient in the office and I told her she could also potentially have the general surgeon at [facility] perform her cholecystectomy in the future.  She is satisfied with this.    Matilde Awan MD  09/02/21  14:29 EDT

## 2021-09-02 NOTE — H&P
[facility] Medicine Services      Patient Name: Kimberly Penaloza  : 1995  MRN: [id number]  Primary Care Physician:  Eber Angel MD  Date of admission: 2021      Subjective      Chief Complaint: Abdominal pain, vomiting    History of Present Illness: Kimberly Penaloza is a 26 y.o. female with no known past medical history who presented to [facility] on 2021 with complaints of abdominal pain.  Patient reports she had abdominal pain that started on Saturday.  [facility] gave her pain medication and nausea medication and referred her to follow-up with a general surgeon outpatient.  On Monday she went to follow-up with general surgeon and he ordered some outpatient labs.  Patient reports she was supposed to get the blood work done today but she started to vomit and her pain started to get worse.  Her current abdominal pain is a 10 out of 10 and sharp.  Heat and pressure makes the pain better.  She denies any aggravating factors.  She reports she has had this pain before.  Her last menstrual period was 2021.  Her last bowel movement was today and she reports it was solid.  She denies any recent  diarrhea, fever, chills, cough, shortness breath, chest pain.    The following records were obtained from [facility]:  All vital signs were stable.  All labs were unremarkable except , , total bilirubin 2.0.  CT abdomen pelvis showed cholelithiasis and choledocholithiasis with dilated common bile duct measuring up to 12 mm.  Recommend surgical and/or GI consultation for likely ERCP.  Hepatic steatosis.    Review of Systems   Constitutional: Negative.   HENT: Negative.    Eyes: Negative.    Cardiovascular: Negative.    Respiratory: Negative.    Skin: Negative.    Musculoskeletal: Negative.    Gastrointestinal: Positive for abdominal pain and vomiting.   Genitourinary: Negative.    Neurological: Negative.     Psychiatric/Behavioral: Negative.         Personal History     Past Medical History:   Diagnosis Date   • Tobacco abuse 2021       Past Surgical History:   Procedure Laterality Date   •  SECTION     • ESSURE TUBAL LIGATION         Family History: family history includes Cancer in her brother; Chromosomal disorder in her brother. Otherwise pertinent FHx was reviewed and not pertinent to current issue.    Social History:  reports that she has been smoking cigarettes. She has a 2.50 pack-year smoking history. She has never used smokeless tobacco. She reports previous alcohol use. She reports previous drug use.    Home Medications:   Cannot display prior to admission medications because the patient has not been admitted in this contact.            Allergies:  No Known Allergies    Objective      Vitals:        Physical Exam  Vitals reviewed.   Constitutional:       Appearance: Normal appearance. She is obese.   HENT:      Head: Normocephalic and atraumatic.      Nose: Nose normal.      Mouth/Throat:      Mouth: Mucous membranes are moist.      Pharynx: Oropharynx is clear.   Eyes:      Extraocular Movements: Extraocular movements intact.      Conjunctiva/sclera: Conjunctivae normal.      Pupils: Pupils are equal, round, and reactive to light.   Cardiovascular:      Rate and Rhythm: Normal rate and regular rhythm.      Pulses: Normal pulses.      Heart sounds: Normal heart sounds.      Comments: S1, S2 audible  Pulmonary:      Effort: Pulmonary effort is normal.      Breath sounds: Normal breath sounds.      Comments: On room air  Abdominal:      General: Abdomen is flat. Bowel sounds are normal.      Palpations: Abdomen is soft.      Tenderness: There is abdominal tenderness.      Comments: Right sided tenderness   Musculoskeletal:         General: Normal range of motion.      Cervical back: Normal range of motion.   Skin:     General: Skin is warm and dry.   Neurological:      General: No focal deficit  present.      Mental Status: She is alert and oriented to person, place, and time. Mental status is at baseline.   Psychiatric:         Mood and Affect: Mood normal.         Behavior: Behavior normal.         Thought Content: Thought content normal.         Judgment: Judgment normal.         Result Review    Result Review:  I have personally reviewed the results from the time of this admission to 9/2/2021 12:28 EDT and agree with these findings:  [x]  Laboratory  [x]  Microbiology  [x]  Radiology  []  EKG/Telemetry   []  Cardiology/Vascular   []  Pathology  []  Old records  []  Other:        Assessment/Plan        Active Hospital Problems:  Active Hospital Problems    Diagnosis    • **Common bile duct calculi    • Obesity (BMI 30-39.9)    • Tobacco abuse      Plan:     Acute cholelithiasis/common bile duct stone  - CT abd/pelvis at [facility] reviewed  - , , Total bilirubin 2.0, monitor   -Check CBC, CMP, COVID test, and HCG   -NPO, IV fluids ordered  -Pain medication ordered  -GI consulted    Obesity  -BMI  -Encourage lifestyle modifications    Tobacco abuse  -Encourage cessation    DVT prophylaxis:    SCD's  CODE STATUS:    Level Of Support Discussed With: Patient  Code Status: CPR  Medical Interventions (Level of Support Prior to Arrest): Full    Admission Status:  I believe this patient meets Observation status.    I discussed the patient's findings and my recommendations with patient and nursing staff.    This patient has been examined wearing appropriate Personal Protective Equipment. 09/02/21      Signature: Electronically signed by EMPERATRIZ Morse, 09/02/21, 12:27 PM EDT.

## 2021-09-02 NOTE — ANESTHESIA PROCEDURE NOTES
Airway  Urgency: elective    Date/Time: 9/2/2021 4:15 PM  Airway not difficult    General Information and Staff    Patient location during procedure: OR  Anesthesiologist: Ammon Walker MD    Indications and Patient Condition  Indications for airway management: airway protection    Preoxygenated: yes  Mask difficulty assessment: 1 - vent by mask    Final Airway Details  Final airway type: endotracheal airway      Successful airway: ETT  Cuffed: yes   Successful intubation technique: direct laryngoscopy  Facilitating devices/methods: intubating stylet  Endotracheal tube insertion site: oral  Blade: Adraino  Blade size: 3  ETT size (mm): 7.0  Cormack-Lehane Classification: grade I - full view of glottis  Placement verified by: chest auscultation, capnometry and palpation of cuff   Measured from: lips  ETT/EBT  to lips (cm): 22  Number of attempts at approach: 1  Assessment: lips, teeth, and gum same as pre-op and atraumatic intubation

## 2021-09-02 NOTE — ANESTHESIA POSTPROCEDURE EVALUATION
Patient: Kimberly Penaloza    Procedure Summary     Date: 09/02/21 Room / Location: [facility] / [facility]    Anesthesia Start: 1615 Anesthesia Stop: 1657    Procedure: ENDOSCOPIC RETROGRADE CHOLANGIOPANCREATOGRAPHY, SPHINCTEROTOMY, CLEARANCE OF BILE DUCT WITH BALLOON ,(12MM-15MM, UP TO 15 MM), EXTRACTION OF BILIARY STONES WITH BALLOON (N/A ) Diagnosis:       Common bile duct calculi      Elevated liver enzymes      (Common bile duct calculi [K80.50])      (Elevated liver enzymes [R74.8])    Surgeons: Juan Carlos De León MD Provider: Ammon Walker MD    Anesthesia Type: general ASA Status: 3          Anesthesia Type: general    Vitals  Vitals Value Taken Time   /72 09/02/21 1749   Temp     Pulse 76 09/02/21 1753   Resp 29 09/02/21 1720   SpO2 90 % 09/02/21 1753   Vitals shown include unvalidated device data.        Post Anesthesia Care and Evaluation    Patient location during evaluation: PACU  Patient participation: complete - patient participated  Level of consciousness: awake  Pain scale: See nurse's notes for pain score.  Pain management: adequate  Airway patency: patent  Anesthetic complications: No anesthetic complications  PONV Status: none  Cardiovascular status: acceptable  Respiratory status: acceptable  Hydration status: acceptable    Comments: Patient seen and examined postoperatively; vital signs stable; SpO2 greater than or equal to 90%; cardiopulmonary status stable; nausea/vomiting adequately controlled; pain adequately controlled; no apparent anesthesia complications; patient discharged from anesthesia care when discharge criteria were met

## 2021-09-02 NOTE — ANESTHESIA PREPROCEDURE EVALUATION
Anesthesia Evaluation     Patient summary reviewed and Nursing notes reviewed   NPO Solid Status: > 8 hours  NPO Liquid Status: > 8 hours           Airway   Mallampati: II  TM distance: >3 FB  Neck ROM: full  Possible difficult intubation  Dental - normal exam     Pulmonary - negative pulmonary ROS and normal exam    breath sounds clear to auscultation  Cardiovascular - negative cardio ROS and normal exam  Exercise tolerance: unable to assess    ECG reviewed  Rhythm: regular  Rate: normal        Neuro/Psych- negative ROS  GI/Hepatic/Renal/Endo    (+) morbid obesity,  liver disease history of elevated LFT,     Musculoskeletal (-) negative ROS    Abdominal  - normal exam   Substance History - negative use     OB/GYN negative ob/gyn ROS         Other - negative ROS                       Anesthesia Plan    ASA 3     general     intravenous induction     Anesthetic plan, all risks, benefits, and alternatives have been provided, discussed and informed consent has been obtained with: patient.

## 2021-09-02 NOTE — OP NOTE
ENDOSCOPIC RETROGRADE CHOLANGIOPANCREATOGRAPHY Procedure Report    Patient Name:  Kimberly Penaloza  YOB: 1995    Date of Surgery:  9/2/2021     Pre-Op Diagnosis:  Common bile duct calculi [K80.50]  Elevated liver enzymes [R74.8]       Post-Op Diagnosis Codes:     * Common bile duct calculi [K80.50]     * Elevated liver enzymes [R74.8]      Procedure/CPT® Codes:      Procedure(s):  ENDOSCOPIC RETROGRADE CHOLANGIOPANCREATOGRAPHY, SPHINCTEROTOMY, CLEARANCE OF BILE DUCT WITH BALLOON ,(12MM-15MM, UP TO 15 MM), EXTRACTION OF BILIARY STONES WITH BALLOON    Staff:  Surgeon(s):  Juan Carlos De León MD         Anesthesia: General  Indocin suppository  100mg    Implants:    Nothing was implanted during the procedure    No blood loss    Specimen:        See Below    Complications:  No immediate post-operative complications    Description of Procedure:  Informed consent was obtained for the procedure, including sedation.  Risks of perforation, hemorrhage, adverse drug reaction and aspiration and pancreatitis were discussed.    The patient was brought into the endoscopy suite. Continuous cardiopulmonary monitoring was performed.  After general anesthesia was administered and endotracheal intubation was achieved, the bite block was inserted into the patient's mouth. The patient was placed in the right semiprone position on the fluoroscopy table. Indocin suppository was inserted into the patient's rectum for prophylaxis.   A  film was obtained which showed normal anatomy.  The duodenoscope was inserted into the patient's mouth and advanced to the second portion of the duodenum without difficulty.   Limited examination of the patient's esophagus, stomach, and duodenum were performed and were normal.  The duodenoscope was brought into the short position with the major papilla in direct visualization, and it appeared normal; there was a small amount of bile flow out of the biliary os.  The DreamTome Rx  pappillotome was inserted in the biliary os.  The hydrophilc tip wire was inserted into the distal common bile duct without difficulty.  The catheter was advanced over the wire, bile was aspirated, and a cholangiogram was obtained.  The cholangiogram showed multiple filling defects within the common bile duct and right intrahepatic bile duct.  The extrahepatic bile duct was dilated to 14 to 15 mm.  The intrahepatic bile ducts were slightly dilated.  The gallbladder did not fill.  A biliary sphincterotomy was performed in the 11:00 to 12 o'clock position without immediate complication.  Subsequently the dream tome was removed from the patient and exchanged for a 12-15 mm balloon catheter.  Balloon sweeps performed multiple times from the mid bile duct in the hilum with successful extraction of multiple cholesterol stones.  A final occlusion cholangiogram was performed showed no subsequent filling defects.  The intrahepatic bile duct stones had migrated down to the common bile duct were successfully extracted.    On completion of the exam, the bowel was decompressed, the scope was removed from the patient, the patient tolerated the procedure well, there were no immediate post-operative complications. The pancreatic duct was not investigated since it was not the duct of interest.        Impression:  Choledocholithiasis status post ERCP with biliary sphincterotomy and successful extraction of CBD stones  Cholelithiasis    Recommendations:  No aspirin or NSAIDs for 7 to 10 days  Cholecystectomy, timing per patient surgeon  If patient tolerates clear liquids upon return to her room, okay to discharge home due to need for urgent childcare.  However she cannot drive today due to anesthesia.  Though no purulence was noted in the common bile duct, I would recommend patient complete a course of antibiotics for possible early cholangitis.  Antibiotics have been sent into the patient's pharmacy electronically.      Juan Carlos Scanlon  MD [employer]     Date: 9/2/2021  Time: 16:47 EDT

## 2021-09-02 NOTE — CONSULTS
GI CONSULT  NOTE:    Referring Provider:  Dr. Olvera    Chief complaint: Abdominal pain and nausea/vomiting    Subjective .     History of present illness: Patient is a 26-year-old female with history of obesity but no other medical problems.  She presented to [facility] with abdominal pain and nausea/vomiting.  CT abdomen/pelvis with contrast shows cholelithiasis and choledocholithiasis with common bile duct of 12 mm.  She was transferred here and arrived today.  She reports continued upper abdominal pain that has been present for the past 5 days.  It radiates to her back and is sharp at times.  Cannot identify any triggers for this.  She has associated nausea/vomiting.  No hematemesis.  Denies fevers and chills at home but temp here is 101.  Notes tea colored urine.  Has been moving her bowels daily and denies rectal bleeding.  She denies alcohol or drug use.  Does smoke cigarettes.      Endo History:  No previous EGD or colonoscopy.    Past Medical History:  Past Medical History:   Diagnosis Date   • Tobacco abuse 2021       Past Surgical History:  Past Surgical History:   Procedure Laterality Date   •  SECTION     • ESSURE TUBAL LIGATION         Social History:  Social History     Tobacco Use   • Smoking status: Current Every Day Smoker     Packs/day: 0.50     Years: 5.00     Pack years: 2.50     Types: Cigarettes   • Smokeless tobacco: Never Used   Vaping Use   • Vaping Use: Never used   Substance Use Topics   • Alcohol use: Not Currently   • Drug use: Not Currently       Family History:  Family History   Problem Relation Age of Onset   • Chromosomal disorder Brother    • Cancer Brother        Medications:  Medications Prior to Admission   Medication Sig Dispense Refill Last Dose   • HYDROcodone-acetaminophen (NORCO) 5-325 MG per tablet Take 1 tablet by mouth Every 6 (Six) Hours As Needed.      • ondansetron (ZOFRAN) 4 MG tablet Take 4 mg by mouth Every 8 (Eight) Hours As Needed for  "Nausea or Vomiting. Pt states she takes this medication whenever she feel like it          Scheduled Meds:sodium chloride, 10 mL, Intravenous, Q12H      Continuous Infusions:sodium chloride, 100 mL/hr      PRN Meds:.•  acetaminophen **OR** acetaminophen **OR** acetaminophen  •  magnesium sulfate **OR** magnesium sulfate in D5W 1g/100mL (PREMIX)  •  Morphine  •  ondansetron **OR** ondansetron  •  potassium chloride  •  sodium chloride    ALLERGIES:  Patient has no known allergies.    ROS:  Review of Systems   Constitutional: Positive for fever. Negative for chills.   Respiratory: Negative for cough and shortness of breath.    Cardiovascular: Negative for chest pain and palpitations.   Gastrointestinal: Positive for abdominal pain, nausea and vomiting. Negative for blood in stool.   Musculoskeletal: Positive for back pain. Negative for arthralgias.   Neurological: Positive for weakness. Negative for dizziness.   Psychiatric/Behavioral: Negative for agitation and confusion.       Objective     Vital Signs:   Visit Vitals  /64   Pulse 82   Temp (!) 101 °F (38.3 °C) (Oral)   Resp 16   Ht 160 cm (63\")   Wt 113 kg (249 lb 9.6 oz)   LMP 07/27/2021 (Approximate)   SpO2 97%   Breastfeeding No   BMI 44.21 kg/m²       Physical Exam:      General Appearance:    Awake and alert, in no acute distress   Head:    Normocephalic, without obvious abnormality, atraumatic   Eyes:            Conjunctivae normal, icteric sclera   Ears:    Ears appear intact with no abnormalities noted   Throat:   No oral lesions, no thrush, oral mucosa moist   Neck:   No adenopathy, supple, no thyromegaly, no JVD   Lungs:     Respirations regular, even and unlabored       Chest Wall:    No abnormalities observed   Abdomen:     Soft, obese, tender epigastric and right upper quadrant areas, no rebound or guarding, non-distended, no hepatosplenomegaly   Rectal:     Deferred   Extremities:   Moves all extremities well, no edema, no cyanosis, no           "   redness   Pulses:   Pulses palpable and equal bilaterally   Skin:   No bleeding, bruising or rash, + jaundice, warm to touch   Lymph nodes:   No palpable adenopathy   Neurologic:   Cranial nerves 2 - 12 grossly intact, no asterixis, sensation intact       Results Review:   I reviewed the patient's labs and imaging.  CBC        CMP        Amylase and Lipase        CRP         Imaging Results (Last 24 Hours)     ** No results found for the last 24 hours. **            ASSESSMENT AND PLAN:    Choledocholithiasis  Elevated liver enzymes  Cholelithiasis  Upper abdominal pain  Nausea/vomiting  Fever  Obesity    Principal Problem:    Common bile duct calculi  Active Problems:    Obesity (BMI 30-39.9)    Tobacco abuse     Plan:  26-year-old female presented today as a transfer from [facility] where she presented with abdominal pain and nausea/vomiting.  CT abdomen/pelvis with contrast was performed and reviewed.  Shows cholelithiasis, choledocholithiasis with CBD of 12 mm, and hepatic steatosis.  This was also shown on right upper quadrant ultrasound.  Labs from [facility] showed normal WBC, hemoglobin 13.8, normal platelet count.  Total bilirubin 2, , , alk phos 168.  Lipase 56.  Discussed ERCP with patient which she is concerned about since she has never had procedures, but she is agreeable to proceed.  ERCP will be performed today with concern for cholangitis.  Start IV antibiotics.  She has been started on IV fluids and PPI as well.  General surgery has also been consulted for lap klaudia at some point.  Monitor labs which have recently been drawn.  Continue supportive care.    I discussed the patients findings and my recommendations with the patient.  Ingrid Baig, EMPERATRIZ  09/02/21  13:18 EDT

## 2021-09-02 NOTE — CASE MANAGEMENT/SOCIAL WORK
Discharge Planning Assessment  [facility]     Patient Name: Kimberly Penaloza  MRN: [id number]  Today's Date: 9/2/2021    Admit Date: 9/2/2021    Discharge Needs Assessment     Row Name 09/02/21 1427       Living Environment    Lives With  significant other;child(isaias), dependent    Current Living Arrangements  home/apartment/condo    Provides Primary Care For  child(isaias)    Able to Return to Prior Arrangements  yes       Resource/Environmental Concerns    Resource/Environmental Concerns  none    Transportation Concerns  car, none       Transition Planning    Patient/Family Anticipates Transition to  home with family    Patient/Family Anticipated Services at Transition  none    Transportation Anticipated  car, drives self;family or friend will provide       Discharge Needs Assessment    Readmission Within the Last 30 Days  no previous admission in last 30 days    Equipment Currently Used at Home  none    Concerns to be Addressed  no discharge needs identified    Anticipated Changes Related to Illness  none    Equipment Needed After Discharge  none        Discharge Plan     Row Name 09/02/21 1425       Plan    Plan  Anticipate routine home.    Plan Comments  Met with patient at bedside lives with spouse and children, independent with ADL's. Denies any financial difficulties and able to afford medications. denies any discharge concerns and has reliable transportation. PCP and Pharmacy confirmed.        Continued Care and Services - Admitted Since 9/2/2021    Coordination has not been started for this encounter.         Demographic Summary     Row Name 09/02/21 1422       General Information    Admission Type  observation    Arrived From  [facility]    Referral Source  admission list    Reason for Consult  discharge planning    Preferred Language  English     Used During This Interaction  no        Functional Status     Row Name 09/02/21 1426       Functional Status    Usual Activity Tolerance   good    Current Activity Tolerance  good       Functional Status, IADL    Medications  independent    Meal Preparation  independent    Housekeeping  independent    Laundry  independent    Shopping  independent       Mental Status    General Appearance WDL  WDL       Mental Status Summary    Recent Changes in Mental Status/Cognitive Functioning  no changes        Met with patient at bedside wearing mask and goggles, Spent less than 15 minutes in room at greater than 6 feet distance.         Mary Jane Daniel RN

## 2021-09-03 NOTE — CASE MANAGEMENT/SOCIAL WORK
Case Management Discharge Note      Final Note: home              Final Discharge Disposition Code: 01 - home or self-care       Mary Jane Daniel RN

## 2021-09-08 ENCOUNTER — ON CAMPUS - OUTPATIENT (OUTPATIENT)
Dept: URBAN - METROPOLITAN AREA HOSPITAL 85 | Facility: HOSPITAL | Age: 26
End: 2021-09-08
Payer: COMMERCIAL

## 2021-09-08 DIAGNOSIS — K80.20 CALCULUS OF GALLBLADDER WITHOUT CHOLECYSTITIS WITHOUT OBSTRU: ICD-10-CM

## 2021-09-08 DIAGNOSIS — R74.8 ABNORMAL LEVELS OF OTHER SERUM ENZYMES: ICD-10-CM

## 2021-09-08 DIAGNOSIS — K80.50 CALCULUS OF BILE DUCT WITHOUT CHOLANGITIS OR CHOLECYSTITIS W: ICD-10-CM

## 2021-09-08 PROCEDURE — 43264 ERCP REMOVE DUCT CALCULI: CPT | Performed by: NURSE PRACTITIONER

## 2021-09-08 PROCEDURE — 43262 ENDO CHOLANGIOPANCREATOGRAPH: CPT | Mod: 59 | Performed by: NURSE PRACTITIONER

## (undated) DEVICE — SPHINCTEROTOME: Brand: DREAMTOME™ RX 44

## (undated) DEVICE — RETRIEVAL BALLOON CATHETER: Brand: EXTRACTOR™ PRO RX

## (undated) DEVICE — PK ENDO GI 50

## (undated) DEVICE — DEV POSITION LK AND BIOP CAP SYS

## (undated) DEVICE — ERBE NESSY®PLATE 170 SPLIT; 168CM²; CABLE 3M: Brand: ERBE